# Patient Record
Sex: MALE | URBAN - METROPOLITAN AREA
[De-identification: names, ages, dates, MRNs, and addresses within clinical notes are randomized per-mention and may not be internally consistent; named-entity substitution may affect disease eponyms.]

---

## 2018-06-14 ENCOUNTER — OFFICE VISIT (OUTPATIENT)
Dept: FAMILY MEDICINE CLINIC | Age: 63
End: 2018-06-14

## 2018-06-14 VITALS
HEART RATE: 75 BPM | SYSTOLIC BLOOD PRESSURE: 161 MMHG | WEIGHT: 267 LBS | HEIGHT: 67 IN | TEMPERATURE: 98.1 F | OXYGEN SATURATION: 97 % | RESPIRATION RATE: 20 BRPM | DIASTOLIC BLOOD PRESSURE: 103 MMHG | BODY MASS INDEX: 41.91 KG/M2

## 2018-06-14 DIAGNOSIS — Z11.1 SCREENING FOR TUBERCULOSIS: ICD-10-CM

## 2018-06-14 DIAGNOSIS — Z02.89 HISTORY AND PHYSICAL EXAMINATION, IMMIGRATION: Primary | ICD-10-CM

## 2018-06-14 DIAGNOSIS — Z02.89 HISTORY AND PHYSICAL EXAMINATION, IMMIGRATION: ICD-10-CM

## 2018-06-14 DIAGNOSIS — Z11.3 SCREENING FOR STD (SEXUALLY TRANSMITTED DISEASE): ICD-10-CM

## 2018-06-14 NOTE — PROGRESS NOTES
HISTORY OF PRESENT ILLNESS  Margo Govea is a 57 y.o. male. HPI Comments: Pt is here for immigration PE. No known medical problems. He has no shot record and has no history of varicella disease      Review of Systems   Constitutional: Negative for chills, fever, malaise/fatigue and weight loss. HENT: Negative for congestion and ear pain. Eyes: Negative for discharge and redness. Respiratory: Negative for cough, hemoptysis and shortness of breath. Cardiovascular: Negative for chest pain, palpitations and orthopnea. Gastrointestinal: Negative for abdominal pain, nausea and vomiting. Genitourinary: Negative for hematuria. Neurological: Negative for weakness and headaches. Endo/Heme/Allergies: Does not bruise/bleed easily. Physical Exam   Constitutional: He is oriented to person, place, and time. He appears well-developed and well-nourished. Eyes: Pupils are equal, round, and reactive to light. Neck: Normal range of motion. Neck supple. No thyromegaly present. Cardiovascular: Normal rate and normal heart sounds. Pulmonary/Chest: Effort normal and breath sounds normal. No respiratory distress. He has no wheezes. He has no rales. Abdominal: Soft. There is no tenderness. Lymphadenopathy:     He has no cervical adenopathy. Neurological: He is alert and oriented to person, place, and time. Skin: Skin is warm. No rash noted. Psychiatric: He has a normal mood and affect. His behavior is normal.   Nursing note and vitals reviewed. ASSESSMENT and PLAN    ICD-10-CM ICD-9-CM    1. History and physical examination, immigration Z02.89 V70.3 COLLECTION VENOUS BLOOD,VENIPUNCTURE      N GONORRHOEA AMPLIFICATION      QUANTIFERON TB GOLD(CLIENT INCUB.)      RPR   2. Screening for tuberculosis Z11.1 V74.1 QUANTIFERON TB GOLD(CLIENT INCUB.)   3.  Screening for STD (sexually transmitted disease) Z11.3 V74.5 N GONORRHOEA AMPLIFICATION      RPR       AVS instructions reviewed with patient, pt verbalized understanding

## 2018-06-14 NOTE — MR AVS SNAPSHOT
[address] [address] 
[phone] Patient: Naseem Morris MRN: [id number] MLI:[id number] Visit Information Date & Time Provider Department Dept. Phone Encounter #  
 6/14/2018  3:30 PM Enoc Lucas [facility] [phone] [id number] Follow-up Instructions Return if symptoms worsen or fail to improve. Upcoming Health Maintenance Date Due DTaP/Tdap/Td series (1 - Tdap) 11/5/1974 FOBT Q 1 YEAR AGE 50-75 11/5/2003 ZOSTER VACCINE AGE 60> 9/5/2013 Influenza Age 5 to Adult 8/1/2018 Allergies as of 6/14/2018  Review Complete On: 6/14/2018 By: Romario Ji LPN No Known Allergies Current Immunizations  Never Reviewed No immunizations on file. Not reviewed this visit You Were Diagnosed With   
  
 Codes Comments History and physical examination, immigration    -  Primary ICD-10-CM: Z02.89 ICD-9-CM: V70.3 Screening for tuberculosis     ICD-10-CM: Z11.1 ICD-9-CM: V74.1 Screening for STD (sexually transmitted disease)     ICD-10-CM: Z11.3 ICD-9-CM: V74.5 Vitals BP Pulse Temp Resp Height(growth percentile) Weight(growth percentile) (!) 161/103 (BP 1 Location: Left arm, BP Patient Position: Sitting) 75 98.1 °F (36.7 °C) (Oral) 20 5' 7\" (1.702 m) 267 lb (121.1 kg) SpO2 BMI Smoking Status 97% 41.82 kg/m2 Never Smoker Vitals History BMI and BSA Data Body Mass Index Body Surface Area  
 41.82 kg/m 2 2.39 m 2 Your Updated Medication List  
  
Notice  As of 6/14/2018  3:53 PM  
 You have not been prescribed any medications. We Performed the Following COLLECTION VENOUS BLOOD,VENIPUNCTURE [id number] CPT(R)] Follow-up Instructions Return if symptoms worsen or fail to improve. To-Do List   
 06/14/2018 Lab:  N GONORRHOEA AMPLIFICATION Patient Instructions Your lab results will be back in 3-4 days. If any of the tests come back positive, you will need a referral to your local Health Department to have parts of the form completed. Get any immunizations that are required at a local pharmacy and bring back proof (receipts) so I can complete the immunization section. GOOD LUCK Introducing [facility]! Michelle Locke introduces [employer] patient portal. Now you can access parts of your medical record, email your doctor's office, and request medication refills online. 1. In your internet browser, go to [email or web address] 2. Click on the First Time User? Click Here link in the Sign In box. You will see the New Member Sign Up page. 3. Enter your [employer] Access Code exactly as it appears below. You will not need to use this code after youve completed the sign-up process. If you do not sign up before the expiration date, you must request a new code. · [employer] Access Code: [email or web address] Expires: 9/12/2018  3:53 PM 
 
4. Enter the last four digits of your Social Security Number (xxxx) and Date of Birth (mm/dd/yyyy) as indicated and click Submit. You will be taken to the next sign-up page. 5. Create a [employer] ID. This will be your [employer] login ID and cannot be changed, so think of one that is secure and easy to remember. 6. Create a [employer] password. You can change your password at any time. 7. Enter your Password Reset Question and Answer. This can be used at a later time if you forget your password. 8. Enter your e-mail address. You will receive e-mail notification when new information is available in [address] 9. Click Sign Up. You can now view and download portions of your medical record. 10. Click the Download Summary menu link to download a portable copy of your medical information.  
 
If you have questions, please visit the Frequently Asked Questions section of the [facility]. Remember, [employer] is NOT to be used for urgent needs. For medical emergencies, dial 911. Now available from your iPhone and Android! Please provide this summary of care documentation to your next provider. If you have any questions after today's visit, please call [phone].